# Patient Record
Sex: MALE | Race: WHITE | URBAN - METROPOLITAN AREA
[De-identification: names, ages, dates, MRNs, and addresses within clinical notes are randomized per-mention and may not be internally consistent; named-entity substitution may affect disease eponyms.]

---

## 2023-01-27 ENCOUNTER — TELEPHONE (OUTPATIENT)
Dept: GASTROENTEROLOGY | Facility: CLINIC | Age: 63
End: 2023-01-27

## 2023-01-27 ENCOUNTER — PREP FOR PROCEDURE (OUTPATIENT)
Dept: GASTROENTEROLOGY | Facility: CLINIC | Age: 63
End: 2023-01-27

## 2023-01-27 DIAGNOSIS — Z12.11 SCREENING FOR COLON CANCER: Primary | ICD-10-CM

## 2023-01-27 NOTE — TELEPHONE ENCOUNTER
01/27/23  Screened by: Viktori[facility]    Referring Provider N/A    Pre- Screening: There is no height or weight on file to calculate BMI  Has patient been referred for a routine screening Colonoscopy? yes  Is the patient between 39-70 years old? yes      Previous Colonoscopy yes   If yes:    Date: 4-5YRS    Facility:     Reason:       SCHEDULING STAFF: If the patient is between 39yrs-47yrs, please advise patient to confirm benefits/coverage with their insurance company for a routine screening colonoscopy, some insurance carriers will only cover at [facility] or [facility]  If the patient is over 66years old, please schedule an office visit  Does the patient want to see a Gastroenterologist prior to their procedure OR are they having any GI symptoms? no    Has the patient been hospitalized or had abdominal surgery in the past 6 months? no    Does the patient use supplemental oxygen? no    Does the patient take Coumadin, Lovenox, Plavix, Elliquis, Xarelto, or other blood thinning medication? no    Has the patient had a stroke, cardiac event, or stent placed in the past year? no    SCHEDULING STAFF: If patient answers NO to above questions, then schedule procedure  If patient answers YES to above questions, then schedule office appointment  PA Passed OA    If patient is between 45yrs - 49yrs, please advise patient that we will have to confirm benefits & coverage with their insurance company for a routine screening colonoscopy

## 2023-01-27 NOTE — TELEPHONE ENCOUNTER
Recvd patients records -sent to [facility] to scan in chart  Called and schled  5yr recall from 2017 screening colonoscopy   Reveiwed and mailed prep Instructions  Scheduled date of colonoscopy (as of today):4/17/23  Physician performing colonoscopy:Patricia  Location of colonoscopy:[facility]  Bowel prep reviewed with patient:Dulco/Miralax  Instructions reviewed with patient by:Michael cruz  Clearances:  none

## 2023-01-27 NOTE — TELEPHONE ENCOUNTER
Patients GI provider:  Dr Delvis Humphrey    Number to return call: [phone]     Reason for call: Pt called in to schedule a colonoscopy but there are no records from previous colonoscopy to refer to  Pt said he was seen by Dr Delvis Humphrey and this was maybe 4-5 years ago      Scheduled procedure/appointment date if applicable: N/A

## 2023-04-17 PROBLEM — I10 ESSENTIAL (PRIMARY) HYPERTENSION: Status: ACTIVE | Noted: 2018-11-01

## 2023-04-17 PROBLEM — I73.9 PAD (PERIPHERAL ARTERY DISEASE) (HCC): Status: ACTIVE | Noted: 2018-11-01

## 2023-04-17 PROBLEM — E78.2 MIXED HYPERLIPIDEMIA: Status: ACTIVE | Noted: 2018-11-01

## 2023-04-17 RX ORDER — LIDOCAINE HYDROCHLORIDE 10 MG/ML
0.5 INJECTION, SOLUTION EPIDURAL; INFILTRATION; INTRACAUDAL; PERINEURAL ONCE AS NEEDED
Status: CANCELLED | OUTPATIENT
Start: 2023-04-17

## 2023-04-17 RX ORDER — SODIUM CHLORIDE, SODIUM LACTATE, POTASSIUM CHLORIDE, CALCIUM CHLORIDE 600; 310; 30; 20 MG/100ML; MG/100ML; MG/100ML; MG/100ML
50 INJECTION, SOLUTION INTRAVENOUS CONTINUOUS
Status: CANCELLED | OUTPATIENT
Start: 2023-04-17

## 2023-04-24 ENCOUNTER — TELEPHONE (OUTPATIENT)
Dept: GASTROENTEROLOGY | Facility: CLINIC | Age: 63
End: 2023-04-24

## 2023-04-24 NOTE — TELEPHONE ENCOUNTER
----- Message from Zenaida Song DO sent at 4/21/2023  6:54 PM EDT -----  Please call the patient with the biopsy results  The 2 polyps showed a precancerous polyp and a benign polyp    The patient is due for repeat colonoscopy in 5 years

## 2023-04-24 NOTE — LETTER
April 24, 2023     [place] [address]    Patient: Sylvester Joshi   YOB: 1960           Dear Tonita Eisenmenger,    [facility] office has attempted to call you regarding your non-urgent results  However, we have been unable to get a hold of you  Please call the office so we can review your results and answer any questions you may have regarding your results      Sincerely,  Sandie Hatchet,  Registered Medical Assistant

## 2023-04-24 NOTE — TELEPHONE ENCOUNTER
----- Message from Shara Mike DO sent at 4/21/2023  6:54 PM EDT -----  Please call the patient with the biopsy results  The 2 polyps showed a precancerous polyp and a benign polyp    The patient is due for repeat colonoscopy in 5 years

## 2023-04-24 NOTE — TELEPHONE ENCOUNTER
Attempted to call but his  voice mail box is not set-up  So was unable to leave a message  Will mail letter to pt for pt to call office for results         MAILED Letter

## 2024-12-04 ENCOUNTER — APPOINTMENT (OUTPATIENT)
Dept: RADIOLOGY | Facility: AMBULARY SURGERY CENTER | Age: 64
End: 2024-12-04
Attending: STUDENT IN AN ORGANIZED HEALTH CARE EDUCATION/TRAINING PROGRAM
Payer: COMMERCIAL

## 2024-12-04 VITALS
BODY MASS INDEX: 27.4 KG/M2 | WEIGHT: 185 LBS | SYSTOLIC BLOOD PRESSURE: 144 MMHG | HEIGHT: 69 IN | HEART RATE: 61 BPM | DIASTOLIC BLOOD PRESSURE: 86 MMHG

## 2024-12-04 DIAGNOSIS — M17.12 PRIMARY OSTEOARTHRITIS OF LEFT KNEE: Primary | ICD-10-CM

## 2024-12-04 DIAGNOSIS — G89.29 CHRONIC PAIN OF LEFT KNEE: ICD-10-CM

## 2024-12-04 DIAGNOSIS — M25.562 LEFT KNEE PAIN, UNSPECIFIED CHRONICITY: ICD-10-CM

## 2024-12-04 DIAGNOSIS — M25.562 CHRONIC PAIN OF LEFT KNEE: ICD-10-CM

## 2024-12-04 PROCEDURE — 99204 OFFICE O/P NEW MOD 45 MIN: CPT | Performed by: STUDENT IN AN ORGANIZED HEALTH CARE EDUCATION/TRAINING PROGRAM

## 2024-12-04 PROCEDURE — 73564 X-RAY EXAM KNEE 4 OR MORE: CPT

## 2024-12-04 RX ORDER — TRAZODONE HYDROCHLORIDE 50 MG/1
TABLET, FILM COATED ORAL
COMMUNITY
Start: 2024-08-22

## 2024-12-04 RX ORDER — VALACYCLOVIR HYDROCHLORIDE 1 G/1
TABLET, FILM COATED ORAL
COMMUNITY
Start: 2024-10-21

## 2024-12-04 NOTE — PROGRESS NOTES
Ortho Sports Medicine Knee New Patient Visit     Assesment:   64 y.o. male left knee tricompartmental osteoarthritis.    Plan:    Isaiah is a pleasant 64-year-old male who presents to clinic today for initial evaluation of his left knee pain that has been ongoing for several years.  After reviewing his history and imaging, as well as a thorough physical exam, I believe the result of his pain is due to his moderate to severe tricompartmental osteoarthritis.  He states that he was receiving Euflexxa injections prior which provided him with significant relief.  As this has provided him significant relief, I have placed a prescription for Durolane injection at today's visit.  I encouraged him to continue to stay active and to use pain as a guide while performing his activities of daily living, as well as while working.  He can continue to use over-the-counter pain medication as needed. All patient's questions and concerns were addressed at today's visit.  He will follow-up once he receives authorization from his insurance for the injection.    Conservative treatment:    Ice to knee for 20 minutes at least 1-2 times daily.  PT for ROM/strengthening to knee, hip and core.  OTC NSAIDS prn for pain.    Imaging:    All imaging from today was reviewed by myself and explained to the patient.       Injection:    A viscosupplementation injection was ordered and will be given at a future visit.      Surgery:     No surgery is recommended at this point, continue with conservative treatment plan as noted.      Follow up:    Return for durolane injections once approved by insurance.        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  He states that he has been having ongoing knee pain since he was 19 years old when he  remembers running down a hill and having a plant twist mechanism.  He denies any recent injuries at this time.  He states that at rest his pain is a 5 out of 10 but at times more severe.  Prior corticosteroid injections have been minimally alleviating.  Prior viscosupplement injections have been significantly alleviating and provided up to 8 to 9 months of relief.  He states that he takes ibuprofen as needed which helps with pain management.  He also states he was on a Euflexxa injection regimen which he states ended in 2024. He denies any catching or locking episodes of his knee.  He denies any numbness tingling at this time.      Knee Surgical History:  None    Past Medical, Social and Family History:  Past Medical History:   Diagnosis Date    Colon polyp     Hyperlipidemia      Past Surgical History:   Procedure Laterality Date    COLONOSCOPY       No Known Allergies  Current Outpatient Medications on File Prior to Visit   Medication Sig Dispense Refill    aspirin (ECOTRIN LOW STRENGTH) 81 mg EC tablet Take 81 mg by mouth daily      cilostazol (PLETAL) 100 mg tablet Take 100 mg by mouth 2 (two) times a day      ezetimibe (ZETIA) 10 mg tablet Take 10 mg by mouth in the morning      losartan (COZAAR) 100 MG tablet Take 100 mg by mouth in the morning      Multiple Vitamin (MULTI VITAMIN DAILY PO)       traZODone (DESYREL) 50 mg tablet       valACYclovir (VALTREX) 1,000 mg tablet  (Patient not taking: Reported on 2024)       No current facility-administered medications on file prior to visit.     Social History     Socioeconomic History    Marital status: /Civil Union     Spouse name: Not on file    Number of children: Not on file    Years of education: Not on file    Highest education level: Not on file   Occupational History    Not on file   Tobacco Use    Smoking status: Former     Current packs/day: 0.00     Types: Cigarettes     Quit date:      Years since quittin.9    Smokeless  "tobacco: Never   Substance and Sexual Activity    Alcohol use: Not Currently    Drug use: Not Currently    Sexual activity: Not on file   Other Topics Concern    Not on file   Social History Narrative    Not on file     Social Drivers of Health     Financial Resource Strain: Not on file   Food Insecurity: Not on file   Transportation Needs: Not on file   Physical Activity: Not on file   Stress: Not on file   Social Connections: Unknown (6/18/2024)    Received from [employer]     How often do you feel lonely or isolated from those around you? (Adult - for ages 18 years and over): Not on file   Intimate Partner Violence: Not on file   Housing Stability: Not on file         I have reviewed the past medical, surgical, social and family history, medications and allergies as documented in the EMR.    Review of systems: ROS is negative other than that noted in the HPI.  Constitutional: Negative for fatigue and fever.   HENT: Negative for sore throat.    Respiratory: Negative for shortness of breath.    Cardiovascular: Negative for chest pain.   Gastrointestinal: Negative for abdominal pain.   Endocrine: Negative for cold intolerance and heat intolerance.   Genitourinary: Negative for flank pain.   Musculoskeletal: Negative for back pain.   Skin: Negative for rash.   Allergic/Immunologic: Negative for immunocompromised state.   Neurological: Negative for dizziness.   Psychiatric/Behavioral: Negative for agitation.      Physical Exam:    Blood pressure 144/86, pulse 61, height 5' 9\" (1.753 m), weight 83.9 kg (185 lb).    General/Constitutional: NAD, well developed, well nourished  HENT: Normocephalic, atraumatic  CV: Intact distal pulses, regular rate  Resp: No respiratory distress or labored breathing  Lymphatic: No lymphadenopathy palpated  Neuro: Alert and Oriented x 3, no focal deficits  Psych: Normal mood, normal affect, normal judgement, normal behavior  Skin: Warm, dry, no rashes, no " erythema      Knee Exam (focused):  Visual inspection of the left knee demonstrates normal contour without atrophy.   No previous incisions   There is no significant erythema or edema.    No significant joint effusion   Range of motion is full from 0-130 degrees of flexion   Able to straight leg raise   No tenderness to palpation   + medial joint line tenderness, - lateral joint line tenderness  - medial Gisell's, - lateral Gisell's  1A Lachman exam, - posterior drawer  - dial test  Stable to varus and valgus stress at both 0 and 30°  Varus deformity passively correctable.  Patella tracks normally with parapatellar crepitus.  No J sign.  No apprehension.  Translation is approximately 2 quadrants and is equal to the contralateral side.  Patellar eversion is similar to the contralateral side.    Examination of the patient's ipsilateral hip demonstrates full painless range of motion.  No crepitus.      LE NV Exam: +2 DP/PT pulses bilaterally  Sensation intact to light touch L2-S1 bilaterally     Bilateral hip ROM demonstrates no pain actively or passively    No calf tenderness to palpation bilaterally    Knee Imaging    X-rays of the left knee were reviewed, which demonstrate severe tricompartmental osteoarthritis with varus deformity of the knee.  I have reviewed the radiology report and do not currently have a radiology reading from [facility], but will check the result once the reading is performed.        Scribe Attestation      I,:  Simone Beltre am acting as a scribe while in the presence of the attending physician.:       I,:  Victor Manuel Tubbs, DO personally performed the services described in this documentation    as scribed in my presence.:

## 2024-12-15 ENCOUNTER — HOSPITAL ENCOUNTER (OUTPATIENT)
Dept: MRI IMAGING | Facility: HOSPITAL | Age: 64
Discharge: HOME | End: 2024-12-15
Payer: COMMERCIAL

## 2024-12-15 DIAGNOSIS — R51.9 INTRACTABLE HEADACHE, UNSPECIFIED CHRONICITY PATTERN, UNSPECIFIED HEADACHE TYPE: ICD-10-CM

## 2024-12-15 PROCEDURE — A9585 GADOBUTROL INJECTION: HCPCS | Performed by: RADIOLOGY

## 2024-12-15 PROCEDURE — 70553 MRI BRAIN STEM W/O & W/DYE: CPT

## 2024-12-15 RX ORDER — GADOBUTROL 604.72 MG/ML
8 INJECTION INTRAVENOUS
Status: COMPLETED | OUTPATIENT
Start: 2024-12-15 | End: 2024-12-15

## 2024-12-15 RX ADMIN — GADOBUTROL 8 ML: 604.72 INJECTION INTRAVENOUS at 11:31

## 2024-12-19 ENCOUNTER — TELEPHONE (OUTPATIENT)
Dept: OBGYN CLINIC | Facility: HOSPITAL | Age: 64
End: 2024-12-19

## 2024-12-19 NOTE — TELEPHONE ENCOUNTER
Caller: Patient    Doctor: Arley    Reason for call: Patient stated he is aware of his [employer] balance but is requesting a call back to discuss pre auth for his gel injections. He wants to know what his out of pocket cost would be with both insurances. Please advise.    Call back#: [phone]

## 2025-03-25 ENCOUNTER — OFFICE VISIT (OUTPATIENT)
Age: 65
End: 2025-03-25
Payer: COMMERCIAL

## 2025-03-25 VITALS — BODY MASS INDEX: 27.11 KG/M2 | WEIGHT: 183 LBS | HEIGHT: 69 IN

## 2025-03-25 DIAGNOSIS — M25.562 CHRONIC PAIN OF LEFT KNEE: ICD-10-CM

## 2025-03-25 DIAGNOSIS — M17.12 PRIMARY OSTEOARTHRITIS OF LEFT KNEE: Primary | ICD-10-CM

## 2025-03-25 DIAGNOSIS — G89.29 CHRONIC PAIN OF LEFT KNEE: ICD-10-CM

## 2025-03-25 DIAGNOSIS — R29.818 NEUROGENIC CLAUDICATION: ICD-10-CM

## 2025-03-25 PROCEDURE — 20610 DRAIN/INJ JOINT/BURSA W/O US: CPT | Performed by: STUDENT IN AN ORGANIZED HEALTH CARE EDUCATION/TRAINING PROGRAM

## 2025-03-25 PROCEDURE — 99214 OFFICE O/P EST MOD 30 MIN: CPT | Performed by: STUDENT IN AN ORGANIZED HEALTH CARE EDUCATION/TRAINING PROGRAM

## 2025-03-25 RX ORDER — TRIAMCINOLONE ACETONIDE 40 MG/ML
40 INJECTION, SUSPENSION INTRA-ARTICULAR; INTRAMUSCULAR
Status: COMPLETED | OUTPATIENT
Start: 2025-03-25 | End: 2025-03-25

## 2025-03-25 RX ORDER — LIDOCAINE HYDROCHLORIDE 10 MG/ML
4 INJECTION, SOLUTION INFILTRATION; PERINEURAL
Status: COMPLETED | OUTPATIENT
Start: 2025-03-25 | End: 2025-03-25

## 2025-03-25 RX ADMIN — LIDOCAINE HYDROCHLORIDE 4 ML: 10 INJECTION, SOLUTION INFILTRATION; PERINEURAL at 09:00

## 2025-03-25 RX ADMIN — TRIAMCINOLONE ACETONIDE 40 MG: 40 INJECTION, SUSPENSION INTRA-ARTICULAR; INTRAMUSCULAR at 09:00

## 2025-03-25 NOTE — PROGRESS NOTES
Knee New Office Note    Assessment:  Assessment & Plan  Primary osteoarthritis of left knee  Isaiah upon examination and review the x-rays of the left knee does demonstrate end-stage osteoarthritis with associated instability. He does have instability to ambulate indicating a chronic ACL tear. I did note that this is not an uncommon occurrence in individuals with significant arthritic change of the knee. However, this can contribute to his instability. Overall, he is quite functional and does not experience significant pain. I did discuss injection therapy, activity modifications and surgical intervention in the form of a total knee arthroplasty. I do believe currently nonoperative care is most appropriate form given the minimal symptoms that he is experiencing and high level of function. Isaiah was amenable to this and also consented to an intra-articular steroid injection. He tolerated the injection well without complication. I would like to see him back in 3 to 4 months for repeat clinical evaluation after he has his lumbar spine evaluated. At that time a repeat steroid injection may be considered versus total knee arthroplasty.   Orders:    Large joint arthrocentesis: L knee    Chronic pain of left knee    Orders:    Large joint arthrocentesis: L knee    Neurogenic claudication  He does have atrophy of the left quadriceps indicating that there may be an underlying lumbar spine issue. I did note that this can contribute to delay in recovery should he undergo total knee arthroplasty. With this in mind I did provide with a referral to pain management for evaluation of his lumbar spine to question neurogenic claudication versus vascular claudication resulting in the cramping and weakness of the lower extremity.   Orders:    Ambulatory referral to Spine & Pain Management; Future       1. Primary osteoarthritis of left knee    2. Chronic pain of left knee    3. Neurogenic claudication          Large joint arthrocentesis:  "L knee  Universal Protocol:  procedure performed by consultantConsent: Verbal consent obtained.  Risks and benefits: risks, benefits and alternatives were discussed  Consent given by: patient  Time out: Immediately prior to procedure a \"time out\" was called to verify the correct patient, procedure, equipment, support staff and site/side marked as required.  Timeout called at: 3/25/2025 11:00 AM.  Patient understanding: patient states understanding of the procedure being performed  Site marked: the operative site was marked  Patient identity confirmed: verbally with patient  Supporting Documentation  Indications: pain   Procedure Details  Location: knee - L knee  Preparation: Patient was prepped and draped in the usual sterile fashion  Needle size: 22 G  Ultrasound guidance: no  Approach: anterolateral  Medications administered: 40 mg triamcinolone acetonide 40 mg/mL; 4 mL lidocaine 1 %    Patient tolerance: patient tolerated the procedure well with no immediate complications  Dressing:  Sterile dressing applied            Subjective:     Patient ID: Isaiah Bryant is a 64 y.o. male.  Chief Complaint:  HPI:  Isaiah is a pleasant 64-year-old male presenting for initial evaluation of his left knee.  He is referred to me today by Dr. Victor Manuel Tubbs.  He states that he has been experiencing chronic pain into his left knee for many years.  He states that he had an injury to his knee approximate 4 years ago that he did not seek formal treatment for.  He states that back then he was experiencing bouts of instability.  However, over the past 10 years or so he has not had any significant episodes of instability of the knee.  He does have complaints of mild to moderate pain to the medial aspect of his knee that he describes as aching and sometimes sharp.  He states that prolonged standing can exacerbate this pain and describes it as a \"stabbing\" pain.  He denies any significant swelling.  He has noticed his knee becoming more bowed.  " He has been receiving gel injections in the past that have provided him with symptomatic relief.  He did not undergo previously ordered injections with Dr. Tubbs as there was extensive cost associated with this due to coverage.  Today he denies any distal paresthesias.  However, he will experience radicular pain into the lateral aspect of the hip with extension into the leg.  He does also experience cramping of the left lower extremity.  He states that generally speaking his left lower extremity is weaker and is not sure if it is secondary to his peripheral vascular disease of his lower extremities.     Allergy:  No Known Allergies  Medications:  all current active meds have been reviewed  Past Medical History:  Past Medical History:   Diagnosis Date    Colon polyp     Hyperlipidemia      Past Surgical History:  Past Surgical History:   Procedure Laterality Date    COLONOSCOPY       Family History:  History reviewed. No pertinent family history.  Social History:  Social History     Substance and Sexual Activity   Alcohol Use Not Currently     Social History     Substance and Sexual Activity   Drug Use Not Currently     Social History     Tobacco Use   Smoking Status Former    Current packs/day: 0.00    Types: Cigarettes    Quit date:     Years since quittin.2   Smokeless Tobacco Never           ROS:  General: Per HPI  Skin: Negative, except if noted below  HEENT: Negative  Respiratory: Negative  Cardiovascular: Negative  Gastrointestinal: Negative  Urinary: Negative  Vascular: Negative  Musculoskeletal: Positive per HPI   Neurologic: Positive per HPI  Endocrine: Negative    Objective:  BP Readings from Last 1 Encounters:   24 144/86      Wt Readings from Last 1 Encounters:   25 83 kg (183 lb)        Respiratory:   non-labored respirations    Lymphatics:  no palpable lymph nodes    Gait:   Normal    Neurologic:   Alert and oriented times 3  Patient with normal sensation except as noted  "below  Deep tendon reflexes 2+ except as noted in MSK exam      Left Knee:      Inspection: skin intact    Overall limb alignment: varus    Effusion: negative    ROM 0-130 with pain    Extensor Lag: negative    Palpation: medial Joint line tenderness to palpation    AP instability at 90 deg:     M/L stability in full extension     M/L stability in midflexion: varus deformity correctable with valgus stress    Motor: 4/5 IP/Q/HS/TA/GS    Pulses: 2+ DP / 2+ PT    SILT DP/SP/S/S/TN    Imaging:  My interpretation XR AP scanogram/AP bilateral knee/lateral/stapleton/sunrise left knee: severe joint space narrowing, subchondral sclerosis, subchondral cysts, osteophyte formation.  No acute fracture.  Anterior subluxation of the tibia consistent with chronic ACL tear.     BMI:   Estimated body mass index is 27.02 kg/m² as calculated from the following:    Height as of this encounter: 5' 9\" (1.753 m).    Weight as of this encounter: 83 kg (183 lb).  BSA:   Estimated body surface area is 1.99 meters squared as calculated from the following:    Height as of this encounter: 5' 9\" (1.753 m).    Weight as of this encounter: 83 kg (183 lb).           Scribe Attestation      I,:  Trey Reina am acting as a scribe while in the presence of the attending physician.:       I,:  Aime Hall, DO personally performed the services described in this documentation    as scribed in my presence.:              "

## 2025-03-25 NOTE — PROGRESS NOTES
Knee New Office Note    Assessment:     1. Primary osteoarthritis of left knee    2. Chronic pain of left knee    3. Neurogenic claudication        Plan:     Problem List Items Addressed This Visit    None  Visit Diagnoses         Primary osteoarthritis of left knee    -  Primary      Chronic pain of left knee          Neurogenic claudication        Relevant Orders    Ambulatory referral to Spine & Pain Management           Isaiah upon examination and review the x-rays of the left knee does demonstrate end-stage osteoarthritis with associated stability.  He does have instability to ambulate indicating a chronic ACL tear.  I did note that this is not an uncommon occurrence in individuals with significant arthritic change of the knee.  However, this can contribute to his instability.  Overall, he is quite functional and does not experience significant pain.  I did discuss injection therapy, activity modifications and surgical intervention in the form of a total knee arthroplasty.  I do believe currently nonoperative care is most appropriate form given the minimal symptoms that he is experiencing and high level of function.  Additionally he does have atrophy of the left quadriceps indicating that there may be an underlying lumbar spine issue.  I did note that this can contribute to delay in recovery should he undergo total knee arthroplasty.  With this in mind I did provide with a referral to pain management for evaluation of his lumbar spine to question neurogenic claudication versus vascular claudication resulting in the cramping and weakness of the lower extremity.  Isaiah was amenable to this and also consented to an intra-articular steroid injection.  He tolerated the injection well without complication.  I would like to see him back in 3 to 4 months for repeat clinical evaluation after he has his lumbar spine evaluated.  At that time a repeat steroid injection may be considered versus total knee  "arthroplasty.    Large joint arthrocentesis: L knee  Universal Protocol:  procedure performed by consultantConsent: Verbal consent obtained.  Risks and benefits: risks, benefits and alternatives were discussed  Consent given by: patient  Time out: Immediately prior to procedure a \"time out\" was called to verify the correct patient, procedure, equipment, support staff and site/side marked as required.  Timeout called at: 3/25/2025 10:09 AM.  Patient understanding: patient states understanding of the procedure being performed  Site marked: the operative site was marked  Patient identity confirmed: verbally with patient  Supporting Documentation  Indications: pain   Procedure Details  Location: knee - L knee  Preparation: Patient was prepped and draped in the usual sterile fashion  Needle size: 22 G  Ultrasound guidance: no  Approach: anterolateral  Medications administered: 40 mg triamcinolone acetonide 40 mg/mL; 4 mL lidocaine 1 %    Patient tolerance: patient tolerated the procedure well with no immediate complications  Dressing:  Sterile dressing applied              Subjective:     Patient ID: Isaiah Bryant is a 64 y.o. male.  Chief Complaint: Left knee pain  HPI:  Isaiah is a pleasant 64-year-old male presenting for initial evaluation of his left knee.  He is referred to me today by Dr. Victor Manuel Tubbs.  He states that he has been experiencing chronic pain into his left knee for many years.  He states that he had an injury to his knee approximate 4 years ago that he did not seek formal treatment for.  He states that back then he was experiencing bouts of instability.  However, over the past 10 years or so he has not had any significant episodes of instability of the knee.  He does have complaints of mild to moderate pain to the medial aspect of his knee that he describes as aching and sometimes sharp.  He states that prolonged standing can exacerbate this pain and describes it as a \"stabbing\" pain.  He denies any " significant swelling.  He has noticed his knee becoming more bowed.  He has been receiving gel injections in the past that have provided him with symptomatic relief.  He did not undergo previously ordered injections with Dr. Tubbs as there was extensive cost associated with this due to coverage.  Today he denies any distal paresthesias.  However, he will experience radicular pain into the lateral aspect of the hip with extension into the leg.  He does also experience cramping of the left lower extremity.  He states that generally speaking his left lower extremity is weaker and is not sure if it is secondary to his peripheral vascular disease of his lower extremities.    Allergy:  No Known Allergies  Medications:  all current active meds have been reviewed  Past Medical History:  Past Medical History:   Diagnosis Date    Colon polyp     Hyperlipidemia      Past Surgical History:  Past Surgical History:   Procedure Laterality Date    COLONOSCOPY       Family History:  History reviewed. No pertinent family history.  Social History:  Social History     Substance and Sexual Activity   Alcohol Use Not Currently     Social History     Substance and Sexual Activity   Drug Use Not Currently     Social History     Tobacco Use   Smoking Status Former    Current packs/day: 0.00    Types: Cigarettes    Quit date:     Years since quittin.2   Smokeless Tobacco Never           ROS:  General: Per HPI  Skin: Negative, except if noted below  HEENT: Negative  Respiratory: Negative  Cardiovascular: Negative  Gastrointestinal: Negative  Urinary: Negative  Vascular: Negative  Musculoskeletal: Positive per HPI   Neurologic: Positive per HPI  Endocrine: Negative    Objective:  BP Readings from Last 1 Encounters:   24 144/86      Wt Readings from Last 1 Encounters:   25 83 kg (183 lb)        Respiratory:   non-labored respirations    Lymphatics:  no palpable lymph nodes    Gait:   normal    Neurologic:   Alert and  "oriented times 3  Patient with normal sensation except as noted below  Deep tendon reflexes 2+ except as noted in MSK exam    Bilateral Lower Extremity:  Left Knee:      Inspection: skin intact    Overall limb alignment: varus    Effusion: negative    ROM 0-130 with pain    Extensor Lag: negative    Palpation: medial Joint line tenderness to palpation    AP instability at 90 deg:     M/L stability in full extension     M/L stability in midflexion: varus deformity correctable with valgus stress    Motor: 4/5 IP/Q/HS/TA/GS    Pulses: 2+ DP / 2+ PT    SILT DP/SP/S/S/TN      Imaging:  My interpretation XR AP scanogram/AP bilateral knee/lateral/stapleton/sunrise left knee: severe joint space narrowing, subchondral sclerosis, subchondral cysts, osteophyte formation. No fracture or dislocation.     BMI:   Estimated body mass index is 27.02 kg/m² as calculated from the following:    Height as of this encounter: 5' 9\" (1.753 m).    Weight as of this encounter: 83 kg (183 lb).  BSA:   Estimated body surface area is 1.99 meters squared as calculated from the following:    Height as of this encounter: 5' 9\" (1.753 m).    Weight as of this encounter: 83 kg (183 lb).           Scribe Attestation      I,:  Trey Reina am acting as a scribe while in the presence of the attending physician.:       I,:  Aime Hall, DO personally performed the services described in this documentation    as scribed in my presence.:              "

## 2025-04-10 ENCOUNTER — OFFICE VISIT (OUTPATIENT)
Dept: PAIN MEDICINE | Facility: CLINIC | Age: 65
End: 2025-04-10
Payer: COMMERCIAL

## 2025-04-10 VITALS
SYSTOLIC BLOOD PRESSURE: 138 MMHG | WEIGHT: 173 LBS | HEIGHT: 69 IN | DIASTOLIC BLOOD PRESSURE: 75 MMHG | BODY MASS INDEX: 25.62 KG/M2 | HEART RATE: 58 BPM

## 2025-04-10 DIAGNOSIS — R29.818 NEUROGENIC CLAUDICATION: ICD-10-CM

## 2025-04-10 DIAGNOSIS — G57.12 MERALGIA PARAESTHETICA, LEFT: Primary | ICD-10-CM

## 2025-04-10 DIAGNOSIS — M54.16 LUMBAR RADICULOPATHY: ICD-10-CM

## 2025-04-10 PROCEDURE — 99244 OFF/OP CNSLTJ NEW/EST MOD 40: CPT | Performed by: PHYSICAL MEDICINE & REHABILITATION

## 2025-04-10 NOTE — PROGRESS NOTES
Assessment  1. Meralgia paraesthetica, left    2. Neurogenic claudication    3. Lumbar radiculopathy        Plan  Mr. Bryant is a pleasant 64-year-old male significant past medical history of peripheral arterial disease, hypertension presents to [facility] for initial evaluation and referral from Dr. Hall regarding suspected neurogenic claudication.  Dr. Hall has been managing his primary knee osteoarthritis and has indicated left quadriceps atrophy questioning lumbar spine issues and stating this could delay recovery if patient were to undergo a left total knee arthroplasty.  During today's evaluation I would agree there appears to be some atrophy of the left quad and he is demonstrating lumbar radiculopathy in the L2 and L3 dermatomal distribution.  Question if he is also experiencing neuralgia paresthetica as he is reporting lancinating pain and burning with prolonged sitting and driving that radiates into the anterolateral left thigh.  At this time we will order x-ray and MRI lumbar spine.  Will also order EMG/NCV of the bilateral lower extremities.  Will also order left-sided lateral femoral cutaneous nerve block under ultrasound guidance.  All questions answered, patient is agreeable with plan.    Complete risks and benefits including bleeding, infection, tissue reaction, nerve injury and allergic reaction were discussed. The approach was demonstrated using models and literature was provided. Verbal and written consent was obtained.    My impressions and treatment recommendations were discussed in detail with the patient who verbalized understanding and had no further questions.  Discharge instructions were provided. I personally saw and examined the patient and I agree with the above discussed plan of care.    Orders Placed This Encounter   Procedures    XR spine lumbar minimum 4 views non injury     Standing Status:   Future     Expected Date:   4/10/2025     Expiration Date:    "4/10/2029     Scheduling Instructions:      Bring along any outside films relating to this procedure.          MRI lumbar spine wo contrast     Standing Status:   Future     Expected Date:   4/10/2025     Expiration Date:   4/10/2029     Scheduling Instructions:      There is no preparation for this test. Please leave your jewelry and valuables at home, wedding rings are the exception. All patients will be required to change into a hospital gown and pants.  Street clothes are not permitted in the MRI.  Magnetic nail polish must be removed prior to arrival for your test. Please bring your insurance cards, a form of photo ID and a list of your medications with you. Arrive 15 minutes prior to your appointment time in order to register. Please bring any prior CT or MRI studies of this area that were not performed at a [facility].            To schedule this appointment, please contact Central Scheduling at [phone].            Prior to your appointment, please make sure you complete the MRI Screening Form when you e-Check in for your appointment. This will be available starting 7 days before your appointment in [employer]. You may receive an e-mail with an activation code if you do not have a [employer] account. If you do not have access to a device, we will complete your screening at your appointment.     Reason for Exam:   lumbar radiculopathy, meralgia parasthetica     For OP exams needed \"URGENT\", choose the appropriate timeframe below and call Central Scheduling at [phone]. No need to speak with a Radiologist.:   Not URGENT     What is the patient's sedation requirement?:   No Sedation     Does the patient need medication for Claustrophobia? If yes, order medication at this point.:   No     Does the patient wear a life vest, have an implanted cardiac device, a stimulation device, a sleep apnea stimulator, or a breast tissue expansion device?:   No     Release to patient through [employer]:   Immediate    " EMG 2 limb lower extremity     Standing Status:   Future     Expiration Date:   4/10/2026     Possible Diagnosis::   meralgesia paresthetica     Does the patient have an external cardiac device (LVAD)?:   No     No orders of the defined types were placed in this encounter.      History of Present Illness    Isaiah Bryant is a 64 y.o. male presents to [facility] for initial evaluation regarding isolated left leg and thigh cramps of 8 months duration.  Denies any significant inciting event or recent trauma.  Currently states symptoms are moderate to severe rated 6-8 out of 10 and impacting quality of life and activities of daily living.  Describes symptoms as cramping.  Denies any significant lower extremity weakness or falls.  Does not use any durable medical equipment formulation.  Symptoms are worse with sitting.  Reports NSAIDs have provided minimal relief.  Has been referred for suspected neurogenic claudication.    I have personally reviewed and/or updated the patient's past medical history, past surgical history, family history, social history, current medications, allergies, and vital signs today.     Review of Systems   Constitutional:  Negative for fever and unexpected weight change.   HENT:  Negative for trouble swallowing.    Eyes:  Negative for visual disturbance.   Respiratory:  Negative for shortness of breath and wheezing.    Cardiovascular:  Negative for chest pain and palpitations.   Gastrointestinal:  Negative for constipation, diarrhea, nausea and vomiting.   Endocrine: Negative for cold intolerance, heat intolerance and polydipsia.   Genitourinary:  Negative for difficulty urinating and frequency.   Musculoskeletal:  Positive for back pain. Negative for arthralgias, gait problem, joint swelling and myalgias.        Left leg pain  Left knee pain   Skin:  Negative for rash.   Neurological:  Negative for dizziness, seizures, syncope, weakness and headaches.   Hematological:   "Does not bruise/bleed easily.   Psychiatric/Behavioral:  Negative for dysphoric mood.    All other systems reviewed and are negative.      Patient Active Problem List   Diagnosis    Essential (primary) hypertension    Mixed hyperlipidemia    PAD (peripheral artery disease) (HCC)       Past Medical History:   Diagnosis Date    Colon polyp     Hyperlipidemia        Past Surgical History:   Procedure Laterality Date    COLONOSCOPY         No family history on file.    Social History     Occupational History    Not on file   Tobacco Use    Smoking status: Former     Current packs/day: 0.00     Types: Cigarettes     Quit date:      Years since quittin.2    Smokeless tobacco: Never   Substance and Sexual Activity    Alcohol use: Not Currently    Drug use: Not Currently    Sexual activity: Not on file       Current Outpatient Medications on File Prior to Visit   Medication Sig    aspirin (ECOTRIN LOW STRENGTH) 81 mg EC tablet Take 81 mg by mouth daily    cilostazol (PLETAL) 100 mg tablet Take 100 mg by mouth 2 (two) times a day    ezetimibe (ZETIA) 10 mg tablet Take 10 mg by mouth in the morning    losartan (COZAAR) 100 MG tablet Take 100 mg by mouth in the morning    Multiple Vitamin (MULTI VITAMIN DAILY PO)     traZODone (DESYREL) 50 mg tablet     valACYclovir (VALTREX) 1,000 mg tablet  (Patient not taking: Reported on 2024)     No current facility-administered medications on file prior to visit.       No Known Allergies    Physical Exam    /75 (BP Location: Right arm, Patient Position: Sitting, Cuff Size: Standard)   Pulse 58   Ht 5' 9\" (1.753 m)   Wt 78.5 kg (173 lb)   BMI 25.55 kg/m²     Constitutional: normal, well developed, well nourished, alert, in no distress and non-toxic and no overt pain behavior.  Eyes: anicteric  HEENT: grossly intact  Neck: supple, symmetric, trachea midline and no masses   Pulmonary:even and unlabored  Cardiovascular:No edema or pitting edema present  Skin:Normal " without rashes or lesions and well hydrated  Psychiatric:Mood and affect appropriate  Neurologic:Cranial Nerves II-XII grossly intact  Musculoskeletal:normal gait, tenderness to palpation left side lumbar paraspinals, decreased active and passive range of motion lumbar flexion, limited by pain, MMT 5 out of 5 bilateral lower extremities except for left hip flexion and knee extension 4+ out of 5, negative Tinel's left hip, paresthesias anterolateral left thigh    Imaging

## 2025-05-01 ENCOUNTER — PROCEDURE VISIT (OUTPATIENT)
Dept: PAIN MEDICINE | Facility: CLINIC | Age: 65
End: 2025-05-01
Payer: COMMERCIAL

## 2025-05-01 ENCOUNTER — HOSPITAL ENCOUNTER (OUTPATIENT)
Dept: RADIOLOGY | Facility: HOSPITAL | Age: 65
Discharge: HOME | End: 2025-05-01
Payer: COMMERCIAL

## 2025-05-01 VITALS
BODY MASS INDEX: 25.62 KG/M2 | WEIGHT: 173 LBS | HEIGHT: 69 IN | SYSTOLIC BLOOD PRESSURE: 131 MMHG | DIASTOLIC BLOOD PRESSURE: 75 MMHG | HEART RATE: 68 BPM

## 2025-05-01 DIAGNOSIS — M54.16 LUMBAR RADICULOPATHY: ICD-10-CM

## 2025-05-01 DIAGNOSIS — M25.552 PAIN OF LEFT HIP: Primary | ICD-10-CM

## 2025-05-01 DIAGNOSIS — G57.12 MERALGIA PARAESTHETICA, LEFT: ICD-10-CM

## 2025-05-01 DIAGNOSIS — R29.818 NEUROGENIC CLAUDICATION: ICD-10-CM

## 2025-05-01 PROCEDURE — 76942 ECHO GUIDE FOR BIOPSY: CPT | Performed by: PHYSICAL MEDICINE & REHABILITATION

## 2025-05-01 PROCEDURE — 72110 X-RAY EXAM L-2 SPINE 4/>VWS: CPT

## 2025-05-01 PROCEDURE — 64450 NJX AA&/STRD OTHER PN/BRANCH: CPT | Performed by: PHYSICAL MEDICINE & REHABILITATION

## 2025-05-01 RX ORDER — BUPIVACAINE HYDROCHLORIDE 2.5 MG/ML
2 INJECTION, SOLUTION EPIDURAL; INFILTRATION; INTRACAUDAL; PERINEURAL ONCE
Status: COMPLETED | OUTPATIENT
Start: 2025-05-01 | End: 2025-05-01

## 2025-05-01 RX ORDER — LIDOCAINE HYDROCHLORIDE 10 MG/ML
3 INJECTION, SOLUTION INFILTRATION; PERINEURAL ONCE
Status: COMPLETED | OUTPATIENT
Start: 2025-05-01 | End: 2025-05-01

## 2025-05-01 RX ORDER — METHYLPREDNISOLONE ACETATE 40 MG/ML
40 INJECTION, SUSPENSION INTRA-ARTICULAR; INTRALESIONAL; INTRAMUSCULAR; SOFT TISSUE ONCE
Status: COMPLETED | OUTPATIENT
Start: 2025-05-01 | End: 2025-05-01

## 2025-05-01 RX ADMIN — METHYLPREDNISOLONE ACETATE 40 MG: 40 INJECTION, SUSPENSION INTRA-ARTICULAR; INTRALESIONAL; INTRAMUSCULAR; SOFT TISSUE at 09:30

## 2025-05-01 RX ADMIN — BUPIVACAINE HYDROCHLORIDE 2 ML: 2.5 INJECTION, SOLUTION EPIDURAL; INFILTRATION; INTRACAUDAL; PERINEURAL at 09:28

## 2025-05-01 RX ADMIN — LIDOCAINE HYDROCHLORIDE 3 ML: 10 INJECTION, SOLUTION INFILTRATION; PERINEURAL at 09:29

## 2025-05-01 NOTE — PROGRESS NOTES
Indication: Anterior lateral thigh pain  Preprocedure diagnosis: Meralgia paresthetica  Postprocedure diagnosis: Meralgia paresthetica  Procedure: Ultrasound-guided left- Lateral Femoral Cutaneous Nerve block  After discussing the risks, benefits, and alternatives to the procedure, the patient expressed understanding and wished to proceed. The patient was brought to the procedure suite and placed in the supine position. A procedural pause was conducted to verify: correct patient identity, procedure to be performed and as applicable, correct side and site, correct patient position, and availability of implants, special equipment or special requirements. A simple surgical tray was used. A simple surgical tray was used. Prior to the procedure, the thigh was examined with a 12 MHz linear transducer to visualize the lateral femoral cutaneous nerve and determine the optimal needle path. Following this, the area was prepared with a ChloraPrep scrub, then re-examined using the same transducer, a sterile ultrasound transducer cover, and sterile ultrasound transducer gel. Thereafter, using ultrasound guidance, a 2.5 inch 25-gauge needle was advanced into the thigh towards the lateral femoral cutaneous nerve. Lfter visualization of the tip near the nerve and negative aspiration for blood, a mixture of 40 mg of Depo-Medrol in 3 mL of 0.25% bupivacaine was injected into the thigh- around the LFCN- good stanley-neural spread of injectate was noted. Following the injection, the needle was withdrawn. The patient tolerated the procedure well and there were no apparent complications. After an appropriate amount of observation, the patient was dismissed from the clinic in good condition under their own power.

## 2025-05-05 ENCOUNTER — HOSPITAL ENCOUNTER (OUTPATIENT)
Dept: MRI IMAGING | Facility: HOSPITAL | Age: 65
Discharge: HOME | End: 2025-05-05
Attending: PHYSICAL MEDICINE & REHABILITATION
Payer: COMMERCIAL

## 2025-05-05 DIAGNOSIS — M54.16 LUMBAR RADICULOPATHY: ICD-10-CM

## 2025-05-05 DIAGNOSIS — R29.818 NEUROGENIC CLAUDICATION: ICD-10-CM

## 2025-05-05 DIAGNOSIS — G57.12 MERALGIA PARAESTHETICA, LEFT: ICD-10-CM

## 2025-05-05 PROCEDURE — 72148 MRI LUMBAR SPINE W/O DYE: CPT

## 2025-05-06 ENCOUNTER — RESULTS FOLLOW-UP (OUTPATIENT)
Dept: PAIN MEDICINE | Facility: CLINIC | Age: 65
End: 2025-05-06

## 2025-05-06 NOTE — TELEPHONE ENCOUNTER
----- Message from Elie Lu DO sent at 5/6/2025  1:59 PM EDT -----  Please notify patient MRI lumbar spine does demonstrate multilevel left-sided foraminal narrowing notable at L3-L4 and L4-L5 which may be contributing to the radicular pattern pain into the anterior left thigh  Would consider a left-sided L3-L4 and L4-L5 TFESI under fluoroscopy guidance  If interested and amenable please schedule  Thank you  ----- Message -----  From: Interface, Radiology Results In  Sent: 5/6/2025   8:47 AM EDT  To: Elie Lu DO

## 2025-05-06 NOTE — TELEPHONE ENCOUNTER
S/w pt and advised of the same, pt had a left hip injection on 5/1 and feels that has really helped with his pain. Pt will call back if he feels he needs to proceed with injection.  Pt was appreciative of call.

## 2025-05-06 NOTE — TELEPHONE ENCOUNTER
Caller: mary lou Colon     Doctor: Dr. Lu    Reason for call: pt returning nurses call     Call back#: [phone]

## 2025-05-12 NOTE — TELEPHONE ENCOUNTER
Pt would like to know why he is being recommended another injection and what kind of procedure it is    Pt an be reached at [phone]

## 2025-05-12 NOTE — RESULT ENCOUNTER NOTE
S/W patient regarding same.  He states he is having great pain relief from the last injection and at this time is not interested in a different injection.  He also had questions about EMG that was ordered.  All questions/concerns were addressed and patient is agreeable to schedule.  Advised that if pain comes back or changes call us back to discuss other injection recommendations. Verbalized understanding and appreciative of call.

## 2025-05-15 ENCOUNTER — TELEPHONE (OUTPATIENT)
Dept: PAIN MEDICINE | Facility: CLINIC | Age: 65
End: 2025-05-15

## 2025-06-24 ENCOUNTER — OFFICE VISIT (OUTPATIENT)
Age: 65
End: 2025-06-24
Payer: COMMERCIAL

## 2025-06-24 VITALS — HEIGHT: 69 IN | WEIGHT: 173 LBS | BODY MASS INDEX: 25.62 KG/M2

## 2025-06-24 DIAGNOSIS — M17.12 PRIMARY OSTEOARTHRITIS OF LEFT KNEE: Primary | ICD-10-CM

## 2025-06-24 PROCEDURE — 99213 OFFICE O/P EST LOW 20 MIN: CPT | Performed by: STUDENT IN AN ORGANIZED HEALTH CARE EDUCATION/TRAINING PROGRAM

## 2025-06-24 NOTE — PROGRESS NOTES
Knee New Office Note    Assessment:     1. Primary osteoarthritis of left knee        Plan:  Assessment & Plan  Primary osteoarthritis of left knee  We reviewed their current history, physical exam, and imaging in detail today with the patient.  We discussed that since he is doing well today we we will hold off on corticosteroid injections.  He will continue to follow-up with spine pain for his radicular symptoms.  He does have a vacation planned for September, and would like to follow-up prior to this for possible injection.  We reviewed instability associated with his osteoarthritis as well as his lumbar radiculopathy.  All of his questions were answered today, he is agreeable to this plan.  He will follow-up in clinic in late August for possible left knee corticosteroid injection.         Subjective:     Patient ID: Isaiah Bryant is a 64 y.o. male.  Chief Complaint:  HPI:  64 y.o. male who presents to clinic today for left knee follow-up.  He was last seen 3 months ago on 3/25/2025 where he received a left knee corticosteroid injection.  He reports he got relief from this injection.  At this time he is not having significant pain.  He does report instability at times.  He is overall doing well and has had no new injuries since he was last seen in clinic.  He is continuing to have radicular symptoms, that he is treating with spine and pain, has an EMG scheduled, and is working with them for treatment recommendations.      Allergy:  Allergies[1]  Medications:  all current active meds have been reviewed  Past Medical History:  Past Medical History[2]  Past Surgical History:  Past Surgical History[3]  Family History:  Family History[4]  Social History:  Social History     Substance and Sexual Activity   Alcohol Use Not Currently     Social History     Substance and Sexual Activity   Drug Use Not Currently     Tobacco Use History[5]        ROS:  General: Per HPI  Skin: Negative, except if noted below  HEENT:  "Negative  Respiratory: Negative  Cardiovascular: Negative  Gastrointestinal: Negative  Urinary: Negative  Vascular: Negative  Musculoskeletal: Positive per HPI   Neurologic: Positive per HPI  Endocrine: Negative    Objective:  BP Readings from Last 1 Encounters:   05/01/25 131/75      Wt Readings from Last 1 Encounters:   06/24/25 78.5 kg (173 lb)        Respiratory:   non-labored respirations    Lymphatics:  no palpable lymph nodes    Gait:   normal    Neurologic:   Alert and oriented times 3  Patient with normal sensation except as noted below  Deep tendon reflexes 2+ except as noted in MSK exam    Bilateral Lower Extremity:  Left Knee:      Inspection: Skin intact    Overall limb alignment varus    Effusion: Negative    ROM 0-1 30 wo pain    Extensor Lag: Negative    Palpation: medial Joint line tenderness to palpation    AP instability at 90 deg     M/L stability in full extension     M/L stability in midflexion varus deformity correctable with valgus stress     Motor: 4/5 IP/Q/HS/TA/GS     Pulses: 2+ DP / 2+ PT    SILT DP/SP/S/S/TN    Imaging:  No new imaging obtained today.   Previous x-rays: My interpretation XR AP scanogram/AP bilateral knee/lateral/stapleton/sunrise left knee: severe joint space narrowing, subchondral sclerosis, subchondral cysts, osteophyte formation.  No acute fracture.  Anterior subluxation of the tibia consistent with chronic ACL tear.     BMI:   Estimated body mass index is 25.55 kg/m² as calculated from the following:    Height as of this encounter: 5' 9\" (1.753 m).    Weight as of this encounter: 78.5 kg (173 lb).  BSA:   Estimated body surface area is 1.94 meters squared as calculated from the following:    Height as of this encounter: 5' 9\" (1.753 m).    Weight as of this encounter: 78.5 kg (173 lb).           Scribe Attestation    I,:  Cami Chavarria am acting as a scribe while in the presence of the attending physician.:       I,:  Aime Hall,  personally performed the " services described in this documentation    as scribed in my presence.:                     [1]  No Known Allergies[2]  Past Medical History:  Diagnosis Date   • Colon polyp    • Hyperlipidemia    [3]  Past Surgical History:  Procedure Laterality Date   • COLONOSCOPY     [4]  No family history on file.[5]  Social History  Tobacco Use   Smoking Status Former   • Current packs/day: 0.00   • Types: Cigarettes   • Quit date:    • Years since quittin.4   Smokeless Tobacco Never

## 2025-07-15 ENCOUNTER — HOSPITAL ENCOUNTER (OUTPATIENT)
Age: 65
Discharge: HOME | End: 2025-07-15
Attending: PHYSICAL MEDICINE & REHABILITATION
Payer: COMMERCIAL

## 2025-07-15 DIAGNOSIS — G57.12 MERALGIA PARAESTHETICA, LEFT: ICD-10-CM

## 2025-07-15 DIAGNOSIS — R29.818 NEUROGENIC CLAUDICATION: ICD-10-CM

## 2025-07-15 DIAGNOSIS — M54.16 LUMBAR RADICULOPATHY: ICD-10-CM

## 2025-07-15 PROCEDURE — 95886 MUSC TEST DONE W/N TEST COMP: CPT | Performed by: PHYSICAL MEDICINE & REHABILITATION

## 2025-07-15 PROCEDURE — 95911 NRV CNDJ TEST 9-10 STUDIES: CPT | Performed by: PHYSICAL MEDICINE & REHABILITATION

## 2025-07-18 ENCOUNTER — TELEPHONE (OUTPATIENT)
Age: 65
End: 2025-07-18

## 2025-07-18 NOTE — TELEPHONE ENCOUNTER
Caller: pt    Doctor: Dr. robert    Reason for call: pt would like to get another L hip injection.    Call back#: [phone]

## 2025-07-18 NOTE — TELEPHONE ENCOUNTER
S/w the patient to inquire. His last ultrasound guided Left Fem Cutaneous block performed on 5/1/25.  Inquired as to how much relief he received and he stated he received 100 % relief with the  burning pain in the thigh. He stated there is a small little area that he notices with driving long distances but the pain is minimal in comparison. He is also receiving injection for his knee pain by another provider. Inquired as to when his pain returned because it has not been 3 months as of yet. He stated he has no real pain but he is leaving in September for [place] for a month and he has to drive 4 hours to [place] and then take the train down. He stated he only has the pain when he is driving long distances. Reviewed that he is having no real pain and these are questions that his insurance will want answered. He stated he just wants to schedule because of anticipated pain before he travels. He wants to schedule for the end of August or beginning of September. He doesn't want to start with pain and then call and then  not be able to get in because [facility] is booked. Again reviewed that injections are good for 3 months. KW to review and advise please and thank you

## 2025-08-19 ENCOUNTER — PROCEDURE VISIT (OUTPATIENT)
Dept: PAIN MEDICINE | Facility: CLINIC | Age: 65
End: 2025-08-19
Payer: COMMERCIAL

## 2025-08-19 VITALS
HEART RATE: 59 BPM | HEIGHT: 69 IN | BODY MASS INDEX: 25.62 KG/M2 | DIASTOLIC BLOOD PRESSURE: 69 MMHG | SYSTOLIC BLOOD PRESSURE: 134 MMHG | WEIGHT: 173 LBS

## 2025-08-19 DIAGNOSIS — G57.12 MERALGIA PARAESTHETICA, LEFT: Primary | ICD-10-CM

## 2025-08-19 PROCEDURE — 64450 NJX AA&/STRD OTHER PN/BRANCH: CPT | Performed by: PHYSICAL MEDICINE & REHABILITATION

## 2025-08-19 PROCEDURE — 76942 ECHO GUIDE FOR BIOPSY: CPT | Performed by: PHYSICAL MEDICINE & REHABILITATION

## 2025-08-19 RX ORDER — METHYLPREDNISOLONE ACETATE 40 MG/ML
40 INJECTION, SUSPENSION INTRA-ARTICULAR; INTRALESIONAL; INTRAMUSCULAR; SOFT TISSUE ONCE
Status: COMPLETED | OUTPATIENT
Start: 2025-08-19 | End: 2025-08-19

## 2025-08-19 RX ORDER — BUPIVACAINE HYDROCHLORIDE 2.5 MG/ML
3 INJECTION, SOLUTION EPIDURAL; INFILTRATION; INTRACAUDAL; PERINEURAL ONCE
Status: COMPLETED | OUTPATIENT
Start: 2025-08-19 | End: 2025-08-19

## 2025-08-19 RX ADMIN — BUPIVACAINE HYDROCHLORIDE 3 ML: 2.5 INJECTION, SOLUTION EPIDURAL; INFILTRATION; INTRACAUDAL; PERINEURAL at 13:30

## 2025-08-19 RX ADMIN — METHYLPREDNISOLONE ACETATE 40 MG: 40 INJECTION, SUSPENSION INTRA-ARTICULAR; INTRALESIONAL; INTRAMUSCULAR; SOFT TISSUE at 13:30

## 2025-08-26 PROBLEM — M17.12 PRIMARY OSTEOARTHRITIS OF LEFT KNEE: Status: ACTIVE | Noted: 2025-08-26
